# Patient Record
Sex: FEMALE | Race: WHITE | HISPANIC OR LATINO
[De-identification: names, ages, dates, MRNs, and addresses within clinical notes are randomized per-mention and may not be internally consistent; named-entity substitution may affect disease eponyms.]

---

## 2023-11-09 ENCOUNTER — APPOINTMENT (OUTPATIENT)
Dept: OBGYN | Facility: CLINIC | Age: 23
End: 2023-11-09
Payer: COMMERCIAL

## 2023-11-09 ENCOUNTER — OUTPATIENT (OUTPATIENT)
Dept: OUTPATIENT SERVICES | Facility: HOSPITAL | Age: 23
LOS: 1 days | End: 2023-11-09
Payer: COMMERCIAL

## 2023-11-09 VITALS
DIASTOLIC BLOOD PRESSURE: 70 MMHG | SYSTOLIC BLOOD PRESSURE: 123 MMHG | RESPIRATION RATE: 20 BRPM | WEIGHT: 163.2 LBS | OXYGEN SATURATION: 100 % | HEART RATE: 60 BPM | HEIGHT: 65 IN | TEMPERATURE: 97.9 F | BODY MASS INDEX: 27.19 KG/M2

## 2023-11-09 DIAGNOSIS — Z00.00 ENCOUNTER FOR GENERAL ADULT MEDICAL EXAMINATION W/OUT ABNORMAL FINDINGS: ICD-10-CM

## 2023-11-09 DIAGNOSIS — Z64.0 PROBLEMS RELATED TO UNWANTED PREGNANCY: ICD-10-CM

## 2023-11-09 PROCEDURE — 76815 OB US LIMITED FETUS(S): CPT | Mod: 26

## 2023-11-09 PROCEDURE — 36415 COLL VENOUS BLD VENIPUNCTURE: CPT

## 2023-11-09 PROCEDURE — 99204 OFFICE O/P NEW MOD 45 MIN: CPT

## 2023-11-09 PROCEDURE — 99215 OFFICE O/P EST HI 40 MIN: CPT

## 2023-11-09 PROCEDURE — 85027 COMPLETE CBC AUTOMATED: CPT

## 2023-11-09 PROCEDURE — 86901 BLOOD TYPING SEROLOGIC RH(D): CPT

## 2023-11-09 PROCEDURE — 86850 RBC ANTIBODY SCREEN: CPT

## 2023-11-09 PROCEDURE — 86900 BLOOD TYPING SEROLOGIC ABO: CPT

## 2023-11-09 RX ORDER — IBUPROFEN 800 MG/1
800 TABLET, FILM COATED ORAL EVERY 8 HOURS
Qty: 12 | Refills: 0 | Status: ACTIVE | COMMUNITY
Start: 2023-11-09 | End: 1900-01-01

## 2023-11-09 RX ORDER — MISOPROSTOL 200 UG/1
200 TABLET ORAL
Qty: 8 | Refills: 0 | Status: ACTIVE | COMMUNITY
Start: 2023-11-09 | End: 1900-01-01

## 2023-11-09 RX ORDER — PROMETHAZINE HYDROCHLORIDE 25 MG/1
25 TABLET ORAL EVERY 6 HOURS
Qty: 12 | Refills: 0 | Status: ACTIVE | COMMUNITY
Start: 2023-11-09 | End: 1900-01-01

## 2023-11-10 LAB
ABO + RH PNL BLD: NORMAL
BASOPHILS # BLD AUTO: 0.01 K/UL
BASOPHILS NFR BLD AUTO: 0.2 %
BLD GP AB SCN SERPL QL: NORMAL
EOSINOPHIL # BLD AUTO: 0.38 K/UL
EOSINOPHIL NFR BLD AUTO: 6.3 %
HCT VFR BLD CALC: 39.9 %
HGB BLD-MCNC: 13.7 G/DL
IMM GRANULOCYTES NFR BLD AUTO: 0.3 %
LYMPHOCYTES # BLD AUTO: 1.49 K/UL
LYMPHOCYTES NFR BLD AUTO: 24.6 %
MAN DIFF?: NORMAL
MCHC RBC-ENTMCNC: 31.9 PG
MCHC RBC-ENTMCNC: 34.3 G/DL
MCV RBC AUTO: 92.8 FL
MONOCYTES # BLD AUTO: 0.71 K/UL
MONOCYTES NFR BLD AUTO: 11.7 %
NEUTROPHILS # BLD AUTO: 3.45 K/UL
NEUTROPHILS NFR BLD AUTO: 56.9 %
PLATELET # BLD AUTO: 350 K/UL
RBC # BLD: 4.3 M/UL
RBC # FLD: 13.5 %
WBC # FLD AUTO: 6.06 K/UL

## 2023-11-14 DIAGNOSIS — Z64.0 PROBLEMS RELATED TO UNWANTED PREGNANCY: ICD-10-CM

## 2023-11-27 ENCOUNTER — APPOINTMENT (OUTPATIENT)
Dept: OBGYN | Facility: CLINIC | Age: 23
End: 2023-11-27
Payer: COMMERCIAL

## 2023-11-27 ENCOUNTER — OUTPATIENT (OUTPATIENT)
Dept: OUTPATIENT SERVICES | Facility: HOSPITAL | Age: 23
LOS: 1 days | End: 2023-11-27
Payer: COMMERCIAL

## 2023-11-27 VITALS
HEIGHT: 65 IN | TEMPERATURE: 97.9 F | HEART RATE: 70 BPM | BODY MASS INDEX: 26.49 KG/M2 | SYSTOLIC BLOOD PRESSURE: 116 MMHG | DIASTOLIC BLOOD PRESSURE: 77 MMHG | WEIGHT: 159 LBS | OXYGEN SATURATION: 99 %

## 2023-11-27 DIAGNOSIS — O03.9 COMPLETE OR UNSPECIFIED SPONTANEOUS ABORTION WITHOUT COMPLICATION: ICD-10-CM

## 2023-11-27 PROCEDURE — 76857 US EXAM PELVIC LIMITED: CPT

## 2023-11-27 PROCEDURE — 58300 INSERT INTRAUTERINE DEVICE: CPT

## 2023-11-27 PROCEDURE — 87491 CHLMYD TRACH DNA AMP PROBE: CPT

## 2023-11-27 PROCEDURE — 99215 OFFICE O/P EST HI 40 MIN: CPT

## 2023-11-27 PROCEDURE — 87591 N.GONORRHOEAE DNA AMP PROB: CPT

## 2023-11-27 PROCEDURE — 76857 US EXAM PELVIC LIMITED: CPT | Mod: 26

## 2023-11-27 PROCEDURE — 87661 TRICHOMONAS VAGINALIS AMPLIF: CPT

## 2023-11-29 DIAGNOSIS — Z30.430 ENCOUNTER FOR INSERTION OF INTRAUTERINE CONTRACEPTIVE DEVICE: ICD-10-CM

## 2023-11-29 DIAGNOSIS — O03.9 COMPLETE OR UNSPECIFIED SPONTANEOUS ABORTION WITHOUT COMPLICATION: ICD-10-CM

## 2023-11-29 DIAGNOSIS — Z30.09 ENCOUNTER FOR OTHER GENERAL COUNSELING AND ADVICE ON CONTRACEPTION: ICD-10-CM

## 2023-11-30 LAB
C TRACH RRNA SPEC QL NAA+PROBE: NOT DETECTED
N GONORRHOEA RRNA SPEC QL NAA+PROBE: NOT DETECTED
SOURCE AMPLIFICATION: NORMAL
SOURCE AMPLIFICATION: NORMAL
T VAGINALIS RRNA SPEC QL NAA+PROBE: NOT DETECTED

## 2024-01-02 ENCOUNTER — APPOINTMENT (OUTPATIENT)
Dept: OBGYN | Facility: CLINIC | Age: 24
End: 2024-01-02

## 2024-08-06 ENCOUNTER — APPOINTMENT (OUTPATIENT)
Dept: OBGYN | Facility: CLINIC | Age: 24
End: 2024-08-06

## 2024-08-06 ENCOUNTER — OUTPATIENT (OUTPATIENT)
Dept: OUTPATIENT SERVICES | Facility: HOSPITAL | Age: 24
LOS: 1 days | End: 2024-08-06
Payer: COMMERCIAL

## 2024-08-06 DIAGNOSIS — Z00.00 ENCOUNTER FOR GENERAL ADULT MEDICAL EXAMINATION WITHOUT ABNORMAL FINDINGS: ICD-10-CM

## 2024-08-06 PROCEDURE — 87491 CHLMYD TRACH DNA AMP PROBE: CPT

## 2024-08-06 PROCEDURE — 58301 REMOVE INTRAUTERINE DEVICE: CPT

## 2024-08-06 PROCEDURE — 99459 PELVIC EXAMINATION: CPT

## 2024-08-06 PROCEDURE — 88142 CYTOPATH C/V THIN LAYER: CPT

## 2024-08-06 PROCEDURE — 87661 TRICHOMONAS VAGINALIS AMPLIF: CPT

## 2024-08-06 PROCEDURE — 87591 N.GONORRHOEAE DNA AMP PROB: CPT

## 2024-08-06 PROCEDURE — 99212 OFFICE O/P EST SF 10 MIN: CPT | Mod: 59

## 2024-08-06 PROCEDURE — 87481 CANDIDA DNA AMP PROBE: CPT

## 2024-08-06 PROCEDURE — 81513 NFCT DS BV RNA VAG FLU ALG: CPT

## 2024-08-07 ENCOUNTER — OUTPATIENT (OUTPATIENT)
Dept: OUTPATIENT SERVICES | Facility: HOSPITAL | Age: 24
LOS: 1 days | End: 2024-08-07

## 2024-08-07 DIAGNOSIS — Z00.00 ENCOUNTER FOR GENERAL ADULT MEDICAL EXAMINATION WITHOUT ABNORMAL FINDINGS: ICD-10-CM

## 2024-08-08 DIAGNOSIS — Z00.00 ENCOUNTER FOR GENERAL ADULT MEDICAL EXAMINATION WITHOUT ABNORMAL FINDINGS: ICD-10-CM

## 2024-08-12 DIAGNOSIS — Z30.09 ENCOUNTER FOR OTHER GENERAL COUNSELING AND ADVICE ON CONTRACEPTION: ICD-10-CM

## 2024-08-12 DIAGNOSIS — T83.9XXA UNSPECIFIED COMPLICATION OF GENITOURINARY PROSTHETIC DEVICE, IMPLANT AND GRAFT, INITIAL ENCOUNTER: ICD-10-CM

## 2024-08-12 DIAGNOSIS — N92.1 EXCESSIVE AND FREQUENT MENSTRUATION WITH IRREGULAR CYCLE: ICD-10-CM

## 2024-10-10 ENCOUNTER — EMERGENCY (EMERGENCY)
Facility: HOSPITAL | Age: 24
LOS: 0 days | Discharge: HOME | End: 2024-10-10
Attending: STUDENT IN AN ORGANIZED HEALTH CARE EDUCATION/TRAINING PROGRAM
Payer: COMMERCIAL

## 2024-10-10 VITALS
DIASTOLIC BLOOD PRESSURE: 80 MMHG | SYSTOLIC BLOOD PRESSURE: 119 MMHG | WEIGHT: 156.09 LBS | HEIGHT: 65 IN | HEART RATE: 85 BPM | OXYGEN SATURATION: 99 % | RESPIRATION RATE: 18 BRPM | TEMPERATURE: 98 F

## 2024-10-10 DIAGNOSIS — Z3A.01 LESS THAN 8 WEEKS GESTATION OF PREGNANCY: ICD-10-CM

## 2024-10-10 DIAGNOSIS — O20.0 THREATENED ABORTION: ICD-10-CM

## 2024-10-10 LAB
ALBUMIN SERPL ELPH-MCNC: 4.9 G/DL — SIGNIFICANT CHANGE UP (ref 3.5–5.2)
ALP SERPL-CCNC: 72 U/L — SIGNIFICANT CHANGE UP (ref 30–115)
ALT FLD-CCNC: 12 U/L — SIGNIFICANT CHANGE UP (ref 0–41)
ANION GAP SERPL CALC-SCNC: 10 MMOL/L — SIGNIFICANT CHANGE UP (ref 7–14)
APPEARANCE UR: CLEAR — SIGNIFICANT CHANGE UP
AST SERPL-CCNC: 19 U/L — SIGNIFICANT CHANGE UP (ref 0–41)
BACTERIA # UR AUTO: NEGATIVE /HPF — SIGNIFICANT CHANGE UP
BASOPHILS # BLD AUTO: 0.03 K/UL — SIGNIFICANT CHANGE UP (ref 0–0.2)
BASOPHILS NFR BLD AUTO: 0.6 % — SIGNIFICANT CHANGE UP (ref 0–1)
BILIRUB SERPL-MCNC: 0.4 MG/DL — SIGNIFICANT CHANGE UP (ref 0.2–1.2)
BILIRUB UR-MCNC: NEGATIVE — SIGNIFICANT CHANGE UP
BLD GP AB SCN SERPL QL: SIGNIFICANT CHANGE UP
BUN SERPL-MCNC: 11 MG/DL — SIGNIFICANT CHANGE UP (ref 10–20)
CALCIUM SERPL-MCNC: 9.8 MG/DL — SIGNIFICANT CHANGE UP (ref 8.4–10.4)
CAST: 2 /LPF — SIGNIFICANT CHANGE UP (ref 0–4)
CHLORIDE SERPL-SCNC: 103 MMOL/L — SIGNIFICANT CHANGE UP (ref 98–110)
CO2 SERPL-SCNC: 24 MMOL/L — SIGNIFICANT CHANGE UP (ref 17–32)
COLOR SPEC: YELLOW — SIGNIFICANT CHANGE UP
CREAT SERPL-MCNC: 0.9 MG/DL — SIGNIFICANT CHANGE UP (ref 0.7–1.5)
DIFF PNL FLD: ABNORMAL
EGFR: 90 ML/MIN/1.73M2 — SIGNIFICANT CHANGE UP
EOSINOPHIL # BLD AUTO: 0.14 K/UL — SIGNIFICANT CHANGE UP (ref 0–0.7)
EOSINOPHIL NFR BLD AUTO: 2.9 % — SIGNIFICANT CHANGE UP (ref 0–8)
GLUCOSE SERPL-MCNC: 100 MG/DL — HIGH (ref 70–99)
GLUCOSE UR QL: NEGATIVE MG/DL — SIGNIFICANT CHANGE UP
HCG SERPL-ACNC: 17.2 MIU/ML — HIGH
HCT VFR BLD CALC: 40.5 % — SIGNIFICANT CHANGE UP (ref 37–47)
HGB BLD-MCNC: 13.9 G/DL — SIGNIFICANT CHANGE UP (ref 12–16)
IMM GRANULOCYTES NFR BLD AUTO: 0.4 % — HIGH (ref 0.1–0.3)
KETONES UR-MCNC: ABNORMAL MG/DL
LEUKOCYTE ESTERASE UR-ACNC: NEGATIVE — SIGNIFICANT CHANGE UP
LYMPHOCYTES # BLD AUTO: 1.33 K/UL — SIGNIFICANT CHANGE UP (ref 1.2–3.4)
LYMPHOCYTES # BLD AUTO: 27.1 % — SIGNIFICANT CHANGE UP (ref 20.5–51.1)
MCHC RBC-ENTMCNC: 30.9 PG — SIGNIFICANT CHANGE UP (ref 27–31)
MCHC RBC-ENTMCNC: 34.3 G/DL — SIGNIFICANT CHANGE UP (ref 32–37)
MCV RBC AUTO: 90 FL — SIGNIFICANT CHANGE UP (ref 81–99)
MONOCYTES # BLD AUTO: 0.53 K/UL — SIGNIFICANT CHANGE UP (ref 0.1–0.6)
MONOCYTES NFR BLD AUTO: 10.8 % — HIGH (ref 1.7–9.3)
NEUTROPHILS # BLD AUTO: 2.86 K/UL — SIGNIFICANT CHANGE UP (ref 1.4–6.5)
NEUTROPHILS NFR BLD AUTO: 58.2 % — SIGNIFICANT CHANGE UP (ref 42.2–75.2)
NITRITE UR-MCNC: NEGATIVE — SIGNIFICANT CHANGE UP
NRBC # BLD: 0 /100 WBCS — SIGNIFICANT CHANGE UP (ref 0–0)
PH UR: 6 — SIGNIFICANT CHANGE UP (ref 5–8)
PLATELET # BLD AUTO: 395 K/UL — SIGNIFICANT CHANGE UP (ref 130–400)
PMV BLD: 9.8 FL — SIGNIFICANT CHANGE UP (ref 7.4–10.4)
POTASSIUM SERPL-MCNC: 4.2 MMOL/L — SIGNIFICANT CHANGE UP (ref 3.5–5)
POTASSIUM SERPL-SCNC: 4.2 MMOL/L — SIGNIFICANT CHANGE UP (ref 3.5–5)
PROT SERPL-MCNC: 8.1 G/DL — HIGH (ref 6–8)
PROT UR-MCNC: SIGNIFICANT CHANGE UP MG/DL
RBC # BLD: 4.5 M/UL — SIGNIFICANT CHANGE UP (ref 4.2–5.4)
RBC # FLD: 13.2 % — SIGNIFICANT CHANGE UP (ref 11.5–14.5)
RBC CASTS # UR COMP ASSIST: 1 /HPF — SIGNIFICANT CHANGE UP (ref 0–4)
SODIUM SERPL-SCNC: 137 MMOL/L — SIGNIFICANT CHANGE UP (ref 135–146)
SP GR SPEC: 1.03 — SIGNIFICANT CHANGE UP (ref 1–1.03)
SQUAMOUS # UR AUTO: 2 /HPF — SIGNIFICANT CHANGE UP (ref 0–5)
UROBILINOGEN FLD QL: 1 MG/DL — SIGNIFICANT CHANGE UP (ref 0.2–1)
WBC # BLD: 4.91 K/UL — SIGNIFICANT CHANGE UP (ref 4.8–10.8)
WBC # FLD AUTO: 4.91 K/UL — SIGNIFICANT CHANGE UP (ref 4.8–10.8)
WBC UR QL: 1 /HPF — SIGNIFICANT CHANGE UP (ref 0–5)

## 2024-10-10 PROCEDURE — 36415 COLL VENOUS BLD VENIPUNCTURE: CPT

## 2024-10-10 PROCEDURE — 36000 PLACE NEEDLE IN VEIN: CPT

## 2024-10-10 PROCEDURE — 81001 URINALYSIS AUTO W/SCOPE: CPT

## 2024-10-10 PROCEDURE — 99285 EMERGENCY DEPT VISIT HI MDM: CPT

## 2024-10-10 PROCEDURE — 76856 US EXAM PELVIC COMPLETE: CPT | Mod: 26

## 2024-10-10 PROCEDURE — 86850 RBC ANTIBODY SCREEN: CPT

## 2024-10-10 PROCEDURE — 76830 TRANSVAGINAL US NON-OB: CPT

## 2024-10-10 PROCEDURE — 76830 TRANSVAGINAL US NON-OB: CPT | Mod: 26

## 2024-10-10 PROCEDURE — 86900 BLOOD TYPING SEROLOGIC ABO: CPT

## 2024-10-10 PROCEDURE — 99283 EMERGENCY DEPT VISIT LOW MDM: CPT

## 2024-10-10 PROCEDURE — 99284 EMERGENCY DEPT VISIT MOD MDM: CPT | Mod: 25

## 2024-10-10 PROCEDURE — 84702 CHORIONIC GONADOTROPIN TEST: CPT

## 2024-10-10 PROCEDURE — 86901 BLOOD TYPING SEROLOGIC RH(D): CPT

## 2024-10-10 PROCEDURE — 85025 COMPLETE CBC W/AUTO DIFF WBC: CPT

## 2024-10-10 PROCEDURE — 80053 COMPREHEN METABOLIC PANEL: CPT

## 2024-10-10 PROCEDURE — 76856 US EXAM PELVIC COMPLETE: CPT

## 2024-10-10 NOTE — ED PROVIDER NOTE - PHYSICAL EXAMINATION
CONSTITUTIONAL: Well-appearing; well-nourished; in no apparent distress.   EYES: PERRL; EOM intact.   ENT: normal nose; no rhinorrhea; normal pharynx with no tonsillar hypertrophy.   NECK: Supple; non-tender; no cervical lymphadenopathy. Equal radial pulses  RESPIRATORY: Normal chest excursion with respiration; breath sounds clear and equal bilaterally; no wheezes, rhonchi, or rales.  GI/: Normal bowel sounds; non-distended; non-tender; no palpable organomegaly.   MS: No evidence of trauma or deformity.  Normal ROM in all four extremities; non-tender to palpation; distal pulses are normal.   SKIN: Normal for age and race; warm; dry; good turgor; no apparent lesions or exudate.   NEURO/PSYCH: A & O x 4; grossly unremarkable. mood and manner are appropriate. Grooming and personal hygiene are appropriate. No apparent thoughts of harm to self or others.

## 2024-10-10 NOTE — ED ADULT NURSE NOTE - OBJECTIVE STATEMENT
Pt presented to ED from home with complaints of bleeding through tampon and sanitary pad after positive pregnancy test, pt stated she was a few days late from her cycle.

## 2024-10-10 NOTE — ED PROVIDER NOTE - CLINICAL SUMMARY MEDICAL DECISION MAKING FREE TEXT BOX
24-year-old presented today for evaluation of vaginal bleeding.  Patient has no IUP and low beta.  OB/GYN consulted to follow with beta list.  Patient discharged at this time with return precautions explained to her.

## 2024-10-10 NOTE — CONSULT NOTE ADULT - ASSESSMENT
25yo , LMP 24, with pregnancy of unknown location, likely early pregnancy loss, clinically and hemodynamically stable.     No acute GYN intervention at this time    -Repeat bhcg in 48 hours  -Bleeding and ectopic precautions discussed  -Tylenol for pain PRN  -Dispo per ED    Discussed with Dr. Foreman

## 2024-10-10 NOTE — ED ADULT TRIAGE NOTE - CHIEF COMPLAINT QUOTE
Heavy vaginal bleeding x 2 days , positive pregnancy test two days ago  LMP 09/08 , pt endorses pelvic pain  UA cup provided in triage

## 2024-10-10 NOTE — ED PROVIDER NOTE - PATIENT PORTAL LINK FT
You can access the FollowMyHealth Patient Portal offered by [facility] by registering at the following website: [email or web address]. By joining [facility]’s FollowMyHealth portal, you will also be able to view your health information using other applications (apps) compatible with our system.

## 2024-10-10 NOTE — ED ADULT NURSE NOTE - NSFALLUNIVINTERV_ED_ALL_ED
Bed/Stretcher in lowest position, wheels locked, appropriate side rails in place/Call bell, personal items and telephone in reach/Instruct patient to call for assistance before getting out of bed/chair/stretcher/Non-slip footwear applied when patient is off stretcher/[place] to call system/Physically safe environment - no spills, clutter or unnecessary equipment/Purposeful proactive rounding/Room/bathroom lighting operational, light cord in reach

## 2024-10-10 NOTE — CONSULT NOTE ADULT - SUBJECTIVE AND OBJECTIVE BOX
Chief Complaint: vaginal bleeding    HPI: 23yo , LMP 24, presents to ED for vaginal bleeding x 3 days. Pt reports first day of bleeding, she bled through 3-5 pads with quarter-sized clots. Pt reports bleeding is now spotting, no clots. She denies abdominal pain, n/v, chest pain, dizziness, SOB and recent fever. Reports this is an unplanned but desired pregnancy.       Ob/Gyn History:                   LMP - 24                Denies history of ovarian cysts, uterine fibroids, abnormal paps, or STIs    OBhx:  iTOP x1, medication    Denies the following: constitutional symptoms, visual symptoms, cardiovascular symptoms, respiratory symptoms, GI symptoms, musculoskeletal symptoms, skin symptoms, neurologic symptoms, hematologic symptoms, allergic symptoms, psychiatric symptoms  Except any pertinent positives listed.     Home Medications: none    Allergies  Intolerances    PAST MEDICAL & SURGICAL HISTORY: none    FAMILY HISTORY: none    SOCIAL HISTORY: Denies cigarette use, alcohol use, or illicit drug use    Vital Signs Last 24 Hrs  T(F): 98.4 (10 Oct 2024 17:30), Max: 98.4 (10 Oct 2024 17:30)  HR: 85 (10 Oct 2024 17:30) (85 - 85)  BP: 119/80 (10 Oct 2024 17:30) (119/80 - 119/80)  RR: 18 (10 Oct 2024 17:30) (18 - 18)  Height (cm): 165.1 (10-10-24 @ 17:30)  Weight (kg): 70.8 (10-10-24 @ 17:30)  BMI (kg/m2): 26 (10-10-24 @ 17:30)  BSA (m2): 1.78 (10-10-24 @ 17:30)    General Appearance - AAOx3, NAD  Abdomen - Soft, nontender, nondistended, no rebound, no rigidity, no guarding    GYN/Pelvis:  Labia Majora - Normal  Labia Minora - Normal  Clitoris - Normal  Urethra - Normal  Vagina - Normal, <10cc dark red blood in vault  Cervix - Normal, c/l/p    Uterus:  Size - approx 6 week size   Tenderness - None  Mass - None  Freely mobile    Adnexa:  Masses - None  Tenderness - None    Meds: none    Height (cm): 165.1 (10-10-24 @ 17:30)  Weight (kg): 70.8 (10-10-24 @ 17:30)  BMI (kg/m2): 26 (10-10-24 @ 17:30)  BSA (m2): 1.78 (10-10-24 @ 17:30)    LABS:                        13.9   4.91  )-----------( 395      ( 10 Oct 2024 18:21 )             40.5     HCG Quantitative, Serum: 17.2 mIU/mL (10-10-24 @ 18:21)    ABO RH Interpretation: A POS (10-10-24 @ 18:21)  Antibody Screen: NEG (10-10-24 @ 18:21)    10-10    137  |  103  |  11  ----------------------------<  100[H]  4.2   |  24  |  0.9    Ca    9.8      10 Oct 2024 18:21    TPro  8.1[H]  /  Alb  4.9  /  TBili  0.4  /  DBili  x   /  AST  19  /  ALT  12  /  AlkPhos  72  10-10      Urinalysis Basic - ( 10 Oct 2024 19:01 )    Color: Yellow / Appearance: Clear / S.026 / pH: x  Gluc: x / Ketone: Trace mg/dL  / Bili: Negative / Urobili: 1.0 mg/dL   Blood: x / Protein: Trace mg/dL / Nitrite: Negative   Leuk Esterase: Negative / RBC: 1 /HPF / WBC 1 /HPF   Sq Epi: x / Non Sq Epi: 2 /HPF / Bacteria: Negative /HPF    RADIOLOGY & ADDITIONAL STUDIES:    ACC: [id number] EXAM:  US PELVIC COMPLETE   ORDERED BY: CRICKET HOWELL     ACC: [id number] EXAM:  US TRANSVAGINAL   ORDERED BY: CRICKET HOWELL     PROCEDURE DATE:  10/10/2024          INTERPRETATION:  CLINICAL INFORMATION: Vaginal bleeding; positive   pregnancy test (beta hCG measuring 17.2)    LMP: 2024    COMPARISON: None available.    TECHNIQUE: Endovaginal and transabdominal pelvic sonogram. Color and   Spectral Doppler was performed.    FINDINGS:  Uterus: 7.7 cm x 3.4 cm x 5.2 cm. Within normal limits.  Endometrium: 7 mm. Within normal limits.    Right ovary: 2.8 cm x 1.4 cm x 1.7 cm. Volume 3 mL. Within normal limits.   Normal arterial and venous waveforms.  Left ovary: 2.9 cm x 1.5 cm x 1.6 cm. Volume 4 mL. Corpus luteum cyst  measuring 1 x 1.3 x 1.1 cm. Normal arterial and venous waveforms.    Fluid: Trace lower pelvic fluid    IMPRESSION:  An intrauterine pregnancy is not visualized. Recommend follow-up pelvic   ultrasound and correlation with serial beta hCG.    Probable left ovarian corpus luteum cyst.    --- End of Report ---      RAMÍREZ SINGH MD; Attending Radiologist  This document has been electronically signed. Oct 10 2024  7:53PM   Chief Complaint: vaginal bleeding    HPI: 25yo , LMP 24, presents to ED for vaginal bleeding x 3 days. Pt reports first day of bleeding, she bled through 3-5 pads with quarter-sized clots. Pt reports bleeding is now spotting, no clots. She denies abdominal pain, n/v, chest pain, dizziness, SOB and recent fever. Reports this is an unplanned but desired pregnancy.       Ob/Gyn History:                   LMP - 24                Denies history of ovarian cysts, uterine fibroids, abnormal paps, or STIs    OBhx:  iTOP x2, medication    Denies the following: constitutional symptoms, visual symptoms, cardiovascular symptoms, respiratory symptoms, GI symptoms, musculoskeletal symptoms, skin symptoms, neurologic symptoms, hematologic symptoms, allergic symptoms, psychiatric symptoms  Except any pertinent positives listed.     Home Medications: none    Allergies  Intolerances    PAST MEDICAL & SURGICAL HISTORY: none    FAMILY HISTORY: none    SOCIAL HISTORY: Denies cigarette use, alcohol use, or illicit drug use    Vital Signs Last 24 Hrs  T(F): 98.4 (10 Oct 2024 17:30), Max: 98.4 (10 Oct 2024 17:30)  HR: 85 (10 Oct 2024 17:30) (85 - 85)  BP: 119/80 (10 Oct 2024 17:30) (119/80 - 119/80)  RR: 18 (10 Oct 2024 17:30) (18 - 18)  Height (cm): 165.1 (10-10-24 @ 17:30)  Weight (kg): 70.8 (10-10-24 @ 17:30)  BMI (kg/m2): 26 (10-10-24 @ 17:30)  BSA (m2): 1.78 (10-10-24 @ 17:30)    General Appearance - AAOx3, NAD  Abdomen - Soft, nontender, nondistended, no rebound, no rigidity, no guarding    GYN/Pelvis:  Labia Majora - Normal  Labia Minora - Normal  Clitoris - Normal  Urethra - Normal  Vagina - Normal, <10cc dark red blood in vault  Cervix - Normal, c/l/p    Uterus:  Size - approx 6 week size   Tenderness - None  Mass - None  Freely mobile    Adnexa:  Masses - None  Tenderness - None    Meds: none    Height (cm): 165.1 (10-10-24 @ 17:30)  Weight (kg): 70.8 (10-10-24 @ 17:30)  BMI (kg/m2): 26 (10-10-24 @ 17:30)  BSA (m2): 1.78 (10-10-24 @ 17:30)    LABS:                        13.9   4.91  )-----------( 395      ( 10 Oct 2024 18:21 )             40.5     HCG Quantitative, Serum: 17.2 mIU/mL (10-10-24 @ 18:21)    ABO RH Interpretation: A POS (10-10-24 @ 18:21)  Antibody Screen: NEG (10-10-24 @ 18:21)    10-10    137  |  103  |  11  ----------------------------<  100[H]  4.2   |  24  |  0.9    Ca    9.8      10 Oct 2024 18:21    TPro  8.1[H]  /  Alb  4.9  /  TBili  0.4  /  DBili  x   /  AST  19  /  ALT  12  /  AlkPhos  72  10-10      Urinalysis Basic - ( 10 Oct 2024 19:01 )    Color: Yellow / Appearance: Clear / S.026 / pH: x  Gluc: x / Ketone: Trace mg/dL  / Bili: Negative / Urobili: 1.0 mg/dL   Blood: x / Protein: Trace mg/dL / Nitrite: Negative   Leuk Esterase: Negative / RBC: 1 /HPF / WBC 1 /HPF   Sq Epi: x / Non Sq Epi: 2 /HPF / Bacteria: Negative /HPF    RADIOLOGY & ADDITIONAL STUDIES:    ACC: [id number] EXAM:  US PELVIC COMPLETE   ORDERED BY: CRICKET HOWELL     ACC: [id number] EXAM:  US TRANSVAGINAL   ORDERED BY: CRICKET HOWELL     PROCEDURE DATE:  10/10/2024          INTERPRETATION:  CLINICAL INFORMATION: Vaginal bleeding; positive   pregnancy test (beta hCG measuring 17.2)    LMP: 2024    COMPARISON: None available.    TECHNIQUE: Endovaginal and transabdominal pelvic sonogram. Color and   Spectral Doppler was performed.    FINDINGS:  Uterus: 7.7 cm x 3.4 cm x 5.2 cm. Within normal limits.  Endometrium: 7 mm. Within normal limits.    Right ovary: 2.8 cm x 1.4 cm x 1.7 cm. Volume 3 mL. Within normal limits.   Normal arterial and venous waveforms.  Left ovary: 2.9 cm x 1.5 cm x 1.6 cm. Volume 4 mL. Corpus luteum cyst  measuring 1 x 1.3 x 1.1 cm. Normal arterial and venous waveforms.    Fluid: Trace lower pelvic fluid    IMPRESSION:  An intrauterine pregnancy is not visualized. Recommend follow-up pelvic   ultrasound and correlation with serial beta hCG.    Probable left ovarian corpus luteum cyst.    --- End of Report ---      RAMÍREZ SINGH MD; Attending Radiologist  This document has been electronically signed. Oct 10 2024  7:53PM

## 2024-10-10 NOTE — ED PROVIDER NOTE - CARE PROVIDER_API CALL
Gretel Rowan  Obstetrics and Gynecology  [address] [place] [id number]  Phone: [phone]  Fax: [phone]  Follow Up Time:

## 2024-10-10 NOTE — ED PROVIDER NOTE - OBJECTIVE STATEMENT
24 year old F  4 weeks pregnant (LMP ) confirmed by + preg test x 3 days presenting to er for eval of vaginal bleeding. sts 3 days ago started having heavy vaginal bleeding. Today bleeding has been lighter (has gone through two pads). + assoc mild lower abd cramping. Pt follows with Dr. Rowan has not yet had appointment. No assoc urinary symptoms, vaginal discharge, back/flank pain, lightheadedness/dizziness, cp/sob.

## 2024-10-12 ENCOUNTER — OUTPATIENT (OUTPATIENT)
Dept: OUTPATIENT SERVICES | Facility: HOSPITAL | Age: 24
LOS: 1 days | End: 2024-10-12
Payer: COMMERCIAL

## 2024-10-12 DIAGNOSIS — O36.80X0 PREGNANCY WITH INCONCLUSIVE FETAL VIABILITY, NOT APPLICABLE OR UNSPECIFIED: ICD-10-CM

## 2024-10-12 PROCEDURE — 84702 CHORIONIC GONADOTROPIN TEST: CPT

## 2024-10-12 PROCEDURE — 36415 COLL VENOUS BLD VENIPUNCTURE: CPT

## 2024-10-12 NOTE — CHART NOTE - NSCHARTNOTEFT_GEN_A_CORE
OBGYN PGY3 Note    spoke with patient on phone, will go to lab today repeat bhcg.  Having mild vaginal bleeding.  will f/u lab value.

## 2024-10-13 DIAGNOSIS — O36.80X0 PREGNANCY WITH INCONCLUSIVE FETAL VIABILITY, NOT APPLICABLE OR UNSPECIFIED: ICD-10-CM

## 2024-10-13 NOTE — CHART NOTE - NSCHARTNOTEFT_GEN_A_CORE
PGY-2 Note/GYN    Patient called to inquire about wellbeing and to let her know her bhcg value of 14. She states she is having some mild abdominal pain and mild vaginal bleeding, less than a period. She is aware to get her labs drawn tomorrow.

## 2024-10-14 ENCOUNTER — OUTPATIENT (OUTPATIENT)
Dept: OUTPATIENT SERVICES | Facility: HOSPITAL | Age: 24
LOS: 1 days | End: 2024-10-14
Payer: COMMERCIAL

## 2024-10-14 LAB
HCG SERPL-MCNC: 12.2 MIU/ML
HCG SERPL-MCNC: 14.4 MIU/ML

## 2024-10-14 PROCEDURE — 36415 COLL VENOUS BLD VENIPUNCTURE: CPT

## 2024-10-14 PROCEDURE — 84702 CHORIONIC GONADOTROPIN TEST: CPT

## 2024-10-14 NOTE — CHART NOTE - NSCHARTNOTEFT_GEN_A_CORE
PGY2 Note/GYN    Patient called to inquire about her wellbeing and remind her of her labwork to be performed today. Patient denies any heavy vaginal bleeding or abdominal pain. She states that she is on her way to get her blood drawn.

## 2024-10-15 NOTE — CHART NOTE - NSCHARTNOTEFT_GEN_A_CORE
PGY-2/ GYN    Patient called to inquire about wellbeing and to let her know that her bhcg value from 10/14, value of 12. No answer, VMx1. Plan to call patient again in the afternoon and let her know to get bhcg labwork on 10/16. PGY-2/ GYN    Patient called to inquire about wellbeing and to let her know that her bhcg value from 10/14, value of 12. No answer, VMx1. Plan to call patient again in the afternoon and let her know to get bhcg labwork on 10/16.    Addendum:     Patient called again, is aware of her lab value and to go to the lab tomorrow. She states that she is having intermittent spotting and cramping but has not had to take any medication. Ectopic precautions given.

## 2024-10-16 ENCOUNTER — OUTPATIENT (OUTPATIENT)
Dept: OUTPATIENT SERVICES | Facility: HOSPITAL | Age: 24
LOS: 1 days | End: 2024-10-16
Payer: COMMERCIAL

## 2024-10-16 DIAGNOSIS — O36.80X0 PREGNANCY WITH INCONCLUSIVE FETAL VIABILITY, NOT APPLICABLE OR UNSPECIFIED: ICD-10-CM

## 2024-10-16 PROCEDURE — 36415 COLL VENOUS BLD VENIPUNCTURE: CPT

## 2024-10-16 PROCEDURE — 84702 CHORIONIC GONADOTROPIN TEST: CPT

## 2024-10-16 NOTE — CHART NOTE - NSCHARTNOTEFT_GEN_A_CORE
PGY2 Note/GYN    Patient called to inquire about her wellbeing and remind her of her labwork to be performed today. No answer, VMx1.

## 2024-10-16 NOTE — CHART NOTE - NSCHARTNOTEFT_GEN_A_CORE
PGY2 Note/GYN    Patient called to inquire about her wellbeing and remind her of her labwork to be performed today. No answer, VMx1

## 2024-10-17 DIAGNOSIS — Z00.00 ENCOUNTER FOR GENERAL ADULT MEDICAL EXAMINATION WITHOUT ABNORMAL FINDINGS: ICD-10-CM

## 2024-10-17 DIAGNOSIS — O36.80X0 PREGNANCY WITH INCONCLUSIVE FETAL VIABILITY, NOT APPLICABLE OR UNSPECIFIED: ICD-10-CM

## 2024-10-17 LAB — HCG SERPL-MCNC: 10.4 MIU/ML

## 2024-10-18 ENCOUNTER — OUTPATIENT (OUTPATIENT)
Dept: OUTPATIENT SERVICES | Facility: HOSPITAL | Age: 24
LOS: 1 days | End: 2024-10-18
Payer: COMMERCIAL

## 2024-10-18 DIAGNOSIS — O36.80X0 PREGNANCY WITH INCONCLUSIVE FETAL VIABILITY, NOT APPLICABLE OR UNSPECIFIED: ICD-10-CM

## 2024-10-18 DIAGNOSIS — Z00.00 ENCOUNTER FOR GENERAL ADULT MEDICAL EXAMINATION WITHOUT ABNORMAL FINDINGS: ICD-10-CM

## 2024-10-18 PROCEDURE — 84702 CHORIONIC GONADOTROPIN TEST: CPT

## 2024-10-18 NOTE — CHART NOTE - NSCHARTNOTEFT_GEN_A_CORE
PGY2/ GYN    10/17: Patient called to inquire about wellbeing and let her know her bhcg value. Patient aware and states that she will follow up in the lab tomorrow    10/18:  Patient called to inquire about wellbeing and to remind her to get her bhcg labwork. No answer, VMx1.

## 2024-10-19 DIAGNOSIS — O36.80X0 PREGNANCY WITH INCONCLUSIVE FETAL VIABILITY, NOT APPLICABLE OR UNSPECIFIED: ICD-10-CM

## 2024-10-19 NOTE — CHART NOTE - NSCHARTNOTESELECT_GEN_ALL_CORE
Bhcg follow up/Event Note
Event Note
Event Note
bhcg follow up/Event Note
Beta List/Event Note
Bhcg follow up/Event Note
beta hcg list
bhcg follow up/Event Note

## 2024-10-19 NOTE — CHART NOTE - NSCHARTNOTEFT_GEN_A_CORE
PGY 3 Note    Patient called L&D to discuss hcg lab results from yesterday. Hcg 4.6, findings consistent with early pregnancy loss. Pt aware. She reports mild brown spotting today, otherwise feeling well. Denies abdominal pain or heavy bleeding. She plans to follow up with her PMD Dr. Rowan in a few weeks.     Labs;  10/10 4.9>13.9/40.5<395 137/4.2/103/24/10/11/0.9<100 AST/ALT 19/12 A POS, antibody neg, bhcg 17.2  10/12 bhcg 14.4 (16.3% decrease)  10/14 bhcg 12 (14% decrease)  10/16 bhcg 10 (16.7% decrease)  10/18 bhcg 4.6    At this point, patient's pregnancy is resolved. GYN team will sign off.

## 2024-10-22 LAB — HCG SERPL-MCNC: 4.6 MIU/ML
